# Patient Record
Sex: FEMALE | Race: OTHER | NOT HISPANIC OR LATINO | Employment: FULL TIME | URBAN - METROPOLITAN AREA
[De-identification: names, ages, dates, MRNs, and addresses within clinical notes are randomized per-mention and may not be internally consistent; named-entity substitution may affect disease eponyms.]

---

## 2019-09-25 ENCOUNTER — TELEPHONE (OUTPATIENT)
Dept: INTERNAL MEDICINE | Facility: CLINIC | Age: 44
End: 2019-09-25

## 2019-09-25 ENCOUNTER — OFFICE VISIT (OUTPATIENT)
Dept: INTERNAL MEDICINE | Facility: CLINIC | Age: 44
End: 2019-09-25
Payer: COMMERCIAL

## 2019-09-25 ENCOUNTER — HOSPITAL ENCOUNTER (OUTPATIENT)
Dept: RADIOLOGY | Facility: HOSPITAL | Age: 44
Discharge: HOME | End: 2019-09-25
Attending: FAMILY MEDICINE
Payer: COMMERCIAL

## 2019-09-25 VITALS
BODY MASS INDEX: 24.76 KG/M2 | HEART RATE: 82 BPM | HEIGHT: 60 IN | WEIGHT: 126.13 LBS | OXYGEN SATURATION: 99 % | SYSTOLIC BLOOD PRESSURE: 120 MMHG | DIASTOLIC BLOOD PRESSURE: 69 MMHG

## 2019-09-25 DIAGNOSIS — R21 FACIAL RASH: ICD-10-CM

## 2019-09-25 DIAGNOSIS — L30.9 ECZEMA, UNSPECIFIED TYPE: ICD-10-CM

## 2019-09-25 DIAGNOSIS — Z12.31 ENCOUNTER FOR SCREENING MAMMOGRAM FOR BREAST CANCER: ICD-10-CM

## 2019-09-25 DIAGNOSIS — Z00.00 ANNUAL PHYSICAL EXAM: Primary | ICD-10-CM

## 2019-09-25 DIAGNOSIS — Z86.69 OTITIS MEDIA FOLLOW-UP, INFECTION RESOLVED: ICD-10-CM

## 2019-09-25 DIAGNOSIS — Z09 OTITIS MEDIA FOLLOW-UP, INFECTION RESOLVED: ICD-10-CM

## 2019-09-25 PROCEDURE — 99386 PR PREVENTIVE VISIT,NEW,40-64: ICD-10-PCS | Mod: 25,S$GLB,, | Performed by: FAMILY MEDICINE

## 2019-09-25 PROCEDURE — 3008F BODY MASS INDEX DOCD: CPT | Mod: CPTII,S$GLB,, | Performed by: FAMILY MEDICINE

## 2019-09-25 PROCEDURE — 99999 PR PBB SHADOW E&M-NEW PATIENT-LVL IV: CPT | Mod: PBBFAC,,, | Performed by: FAMILY MEDICINE

## 2019-09-25 PROCEDURE — 99386 PREV VISIT NEW AGE 40-64: CPT | Mod: 25,S$GLB,, | Performed by: FAMILY MEDICINE

## 2019-09-25 PROCEDURE — 3008F PR BODY MASS INDEX (BMI) DOCUMENTED: ICD-10-PCS | Mod: CPTII,S$GLB,, | Performed by: FAMILY MEDICINE

## 2019-09-25 PROCEDURE — 99999 PR PBB SHADOW E&M-NEW PATIENT-LVL IV: ICD-10-PCS | Mod: PBBFAC,,, | Performed by: FAMILY MEDICINE

## 2019-09-25 PROCEDURE — 77067 SCR MAMMO BI INCL CAD: CPT | Mod: TC

## 2019-09-25 PROCEDURE — 77067 SCR MAMMO BI INCL CAD: CPT | Mod: 26,,, | Performed by: RADIOLOGY

## 2019-09-25 PROCEDURE — 77063 BREAST TOMOSYNTHESIS BI: CPT | Mod: 26,,, | Performed by: RADIOLOGY

## 2019-09-25 PROCEDURE — 77067 MAMMO DIGITAL SCREENING BILAT WITH TOMOSYNTHESIS_CAD: ICD-10-PCS | Mod: 26,,, | Performed by: RADIOLOGY

## 2019-09-25 PROCEDURE — 77063 MAMMO DIGITAL SCREENING BILAT WITH TOMOSYNTHESIS_CAD: ICD-10-PCS | Mod: 26,,, | Performed by: RADIOLOGY

## 2019-09-25 RX ORDER — TRIAMCINOLONE ACETONIDE 0.25 MG/G
CREAM TOPICAL 2 TIMES DAILY
Qty: 15 G | Refills: 2 | Status: SHIPPED | OUTPATIENT
Start: 2019-09-25 | End: 2021-07-28

## 2019-09-25 NOTE — PROGRESS NOTES
Subjective:       Patient ID: Oanh Mullen is a 43 y.o. female.    Chief Complaint: Establish Care and Facial Swelling (x1 wk)    HPI    43yoF presents to [facility] care.    Also c/o rash to face -- Noticed rdeness and swelling and itchiness x 1month. Getting better mildly over time. No new soap.    Ear infxn x 1wk -- was started on amoxicillin 1 wk ago for bilateral ear infxn but rash was already present prior to this. Only took 3-4 days worth, did not finish full 1 week course. Did not make rash worse, pt states did feel more itchy once started amoxicillin.    Review of Systems   Constitutional: Negative for appetite change, fatigue and fever.   HENT: Negative for ear pain, hearing loss, sinus pain and sore throat.    Eyes: Negative for pain and redness.   Respiratory: Negative for cough and shortness of breath.    Cardiovascular: Negative for chest pain and palpitations.   Gastrointestinal: Negative for abdominal pain, constipation, diarrhea, nausea and vomiting.   Endocrine: Negative for cold intolerance and heat intolerance.   Genitourinary: Negative for dysuria.   Musculoskeletal: Negative for back pain and neck pain.   Skin: Positive for rash.   Neurological: Negative for dizziness, weakness, numbness and headaches.         Past Medical History:   Diagnosis Date    Hemorrhoids         Prior to Admission medications    Not on File        Past medical history, surgical history, and family medical history reviewed and updated as appropriate.    Medications and allergies reviewed.     Objective:          Vitals:    09/25/19 0920   BP: 120/69   BP Location: Right arm   Patient Position: Sitting   BP Method: Medium (Manual)   Pulse: 82   SpO2: 99%   Weight: 57.2 kg (126 lb 1.7 oz)   Height: 5' (1.524 m)     Body mass index is 24.63 kg/m².  Physical Exam   Constitutional: She appears well-developed and well-nourished.   HENT:   Right Ear: Hearing normal. Tympanic membrane is not erythematous and not bulging.   Left  Ear: Hearing normal. Tympanic membrane is not erythematous and not bulging.   Dry skin noted throughout bilateral ear canals along with dry patch behind right ear around 2-3cm in diameter.    Eyes: Pupils are equal, round, and reactive to light. EOM are normal.   Cardiovascular: Normal rate, regular rhythm, normal heart sounds and intact distal pulses.   No murmur heard.  Pulmonary/Chest: Effort normal and breath sounds normal. No respiratory distress. She has no wheezes.   Skin: Rash noted. Rash is urticarial.   Erythematous swelling noted under bilateral eyes and bilateral cheeks. Dry excoriated skin around left index finger dorsally MCP.    Vitals reviewed.      Lab Results   Component Value Date    WBC 4.07 09/25/2019    HGB 11.9 (L) 09/25/2019    HCT 35.1 (L) 09/25/2019     09/25/2019    CHOL 135 06/30/2016    TRIG 221 (H) 06/30/2016    HDL 37 (L) 06/30/2016    ALT 9 (L) 09/25/2019    AST 20 09/25/2019     09/25/2019    K 3.9 09/25/2019     09/25/2019    CREATININE 0.7 09/25/2019    BUN 8 09/25/2019    CO2 26 09/25/2019    TSH 1.475 06/30/2016    INR 1.0 04/18/2014    HGBA1C 5.3 08/08/2014       Assessment:       1. Annual physical exam    2. Encounter for screening mammogram for breast cancer    3. Eczema, unspecified type    4. Facial rash    5. Otitis media follow-up, infection resolved        Plan:   Oanh was seen today for establish care and facial swelling.    Diagnoses and all orders for this visit:    Annual physical exam  -     Comprehensive metabolic panel; Future  -     CBC auto differential; Future  -     Lipid panel; Future  -     Hemoglobin A1c; Future  -     TSH; Future  - general labwork today and will f/u with patient on results. Records reviewed from previous visits.     Encounter for screening mammogram for breast cancer  -     Cancel: Mammo Digital Screening Bilat; Future  - due for mgram screening, does see obgyn who can also order. Discussed that she is also due for pap  screening which pt wanted to discuss further with radha washburn (Dr. Jensen)    Eczema, unspecified type  -     triamcinolone acetonide 0.025% (KENALOG) 0.025 % cream; Apply topically 2 (two) times daily.  - will try topical steroid for itchy rash / eczema, f/u 2 wks to see if this plus benadryl resolves symptoms. Patient given ED or UC precautions should symptoms progress or worsen.    Facial rash  - dry scaly rash behind right ear and within bl ear canals. Concerning for seborrheic dermatitis vs. Other. Will start with treatment for eczema and allergic inflammation with otc benadryl along with topical steroid cream. Consider topical antifungals. Consider derm vs. Allergy referral.    Bilateral ear infection  - rec OTC ear drops for lubrication today as noted dry skin in external ear canals, no redness or bulging appreciated, pt did stop abx before finishing full course. F/u next visit.      Health maintenance reviewed with patient. Will follow up with Dr. Jensen ([facility]) for pap smear as due at this time.     No follow-ups on file.    Mich uGy MD  Family Medicine  [facility]  9/25/2019

## 2019-09-25 NOTE — PATIENT INSTRUCTIONS
Benadryl over the counter for itchiness and rash. Use at night or as needed during the day. Once rash resolves, stop Benadryl and can start Claritin or Zyrtec daily for 1-2 months with changing of seasons.     Try Over the counter ear drop in bilateral ears to potentially help with dry skin.

## 2019-09-26 ENCOUNTER — TELEPHONE (OUTPATIENT)
Dept: INTERNAL MEDICINE | Facility: CLINIC | Age: 44
End: 2019-09-26

## 2019-10-09 ENCOUNTER — OFFICE VISIT (OUTPATIENT)
Dept: INTERNAL MEDICINE | Facility: CLINIC | Age: 44
End: 2019-10-09
Payer: COMMERCIAL

## 2019-10-09 VITALS
HEIGHT: 63 IN | HEART RATE: 64 BPM | SYSTOLIC BLOOD PRESSURE: 108 MMHG | OXYGEN SATURATION: 99 % | WEIGHT: 126.56 LBS | DIASTOLIC BLOOD PRESSURE: 70 MMHG | BODY MASS INDEX: 22.43 KG/M2

## 2019-10-09 DIAGNOSIS — J30.9 ALLERGIC RHINITIS, UNSPECIFIED SEASONALITY, UNSPECIFIED TRIGGER: Primary | ICD-10-CM

## 2019-10-09 PROCEDURE — 99999 PR PBB SHADOW E&M-EST. PATIENT-LVL III: CPT | Mod: PBBFAC,,, | Performed by: FAMILY MEDICINE

## 2019-10-09 PROCEDURE — 99999 PR PBB SHADOW E&M-EST. PATIENT-LVL III: ICD-10-PCS | Mod: PBBFAC,,, | Performed by: FAMILY MEDICINE

## 2019-10-09 PROCEDURE — 99214 PR OFFICE/OUTPT VISIT, EST, LEVL IV, 30-39 MIN: ICD-10-PCS | Mod: S$GLB,,, | Performed by: FAMILY MEDICINE

## 2019-10-09 PROCEDURE — 3008F PR BODY MASS INDEX (BMI) DOCUMENTED: ICD-10-PCS | Mod: CPTII,S$GLB,, | Performed by: FAMILY MEDICINE

## 2019-10-09 PROCEDURE — 3008F BODY MASS INDEX DOCD: CPT | Mod: CPTII,S$GLB,, | Performed by: FAMILY MEDICINE

## 2019-10-09 PROCEDURE — 99214 OFFICE O/P EST MOD 30 MIN: CPT | Mod: S$GLB,,, | Performed by: FAMILY MEDICINE

## 2019-10-09 RX ORDER — CETIRIZINE HYDROCHLORIDE 10 MG/1
10 TABLET ORAL DAILY
Qty: 30 TABLET | Refills: 5 | COMMUNITY
Start: 2019-10-09 | End: 2021-07-28

## 2019-10-09 RX ORDER — FLUTICASONE PROPIONATE 50 MCG
1 SPRAY, SUSPENSION (ML) NASAL DAILY
Qty: 16 G | Refills: 5 | Status: SHIPPED | OUTPATIENT
Start: 2019-10-09 | End: 2019-11-08

## 2019-10-09 NOTE — PATIENT INSTRUCTIONS
Try benadryl over the counter    Try Zyrtec (Cetirizine) once daily    Try Flonase nasal spray (one spray in each nostril once daily)

## 2019-10-09 NOTE — PROGRESS NOTES
"Subjective:       Patient ID: Oanh Mullen is a 43 y.o. female.    Chief Complaint: Follow-up (2 Week)    HPI    43yoF presents for 2 week follow up.    #Rash and ear infxn seen on 9/25/19  - wanted patient to try topical steroid cream and OTC benadryl but has not tried yet. Overall is improved today but reports that redness, rash and puffiness around face will still come and go.   - denies any new systemic symptoms  - believes that she knows when it will get worse when she has chocolate, or around hot things such as when cooking.    Review of Systems   Constitutional: Negative for appetite change, fatigue and fever.   HENT: Negative for ear pain, hearing loss, sinus pain and sore throat.    Eyes: Negative for pain and redness.   Respiratory: Negative for cough and shortness of breath.    Cardiovascular: Negative for chest pain and palpitations.   Gastrointestinal: Negative for abdominal pain, constipation, diarrhea, nausea and vomiting.   Endocrine: Negative for cold intolerance and heat intolerance.   Genitourinary: Negative for dysuria.   Musculoskeletal: Negative for back pain and neck pain.   Skin: Positive for rash.   Neurological: Negative for dizziness, weakness, numbness and headaches.         Past Medical History:   Diagnosis Date    Hemorrhoids         Prior to Admission medications    Medication Sig Start Date End Date Taking? Authorizing Provider   triamcinolone acetonide 0.025% (KENALOG) 0.025 % cream Apply topically 2 (two) times daily. 9/25/19  Yes Mich Guy MD        Past medical history, surgical history, and family medical history reviewed and updated as appropriate.    Medications and allergies reviewed.     Objective:          Vitals:    10/09/19 0850   BP: 108/70   BP Location: Right arm   Patient Position: Sitting   BP Method: Medium (Manual)   Pulse: 64   SpO2: 99%   Weight: 57.4 kg (126 lb 8.7 oz)   Height: 5' 2.5" (1.588 m)     Body mass index is 22.78 kg/m².  Physical Exam "   Constitutional: She appears well-developed and well-nourished.   HENT:   Right Ear: Hearing normal. Tympanic membrane is not erythematous and not bulging.   Left Ear: Hearing normal. Tympanic membrane is not erythematous and not bulging.   Dry skin noted throughout bilateral ear canals along with dry patch behind right ear around 2-3cm in diameter.    Eyes: Pupils are equal, round, and reactive to light. EOM are normal.   Cardiovascular: Normal rate, regular rhythm, normal heart sounds and intact distal pulses.   No murmur heard.  Pulmonary/Chest: Effort normal and breath sounds normal. No respiratory distress. She has no wheezes.   Skin: Rash noted. Rash is urticarial.   Rash seems improved today around face. No redness or swelling appreciated. Dry excoriated skin around left index finger dorsally MCP.    Vitals reviewed.      Lab Results   Component Value Date    WBC 4.07 09/25/2019    HGB 11.9 (L) 09/25/2019    HCT 35.1 (L) 09/25/2019     09/25/2019    CHOL 133 09/25/2019    TRIG 238 (H) 09/25/2019    HDL 35 (L) 09/25/2019    ALT 9 (L) 09/25/2019    AST 20 09/25/2019     09/25/2019    K 3.9 09/25/2019     09/25/2019    CREATININE 0.7 09/25/2019    BUN 8 09/25/2019    CO2 26 09/25/2019    TSH 0.909 09/25/2019    INR 1.0 04/18/2014    HGBA1C 5.5 09/25/2019       Assessment:       1. Allergic rhinitis, unspecified seasonality, unspecified trigger        Plan:   Oanh was seen today for follow-up.    Diagnoses and all orders for this visit:    Allergic rhinitis, unspecified seasonality, unspecified trigger  -     fluticasone propionate (FLONASE) 50 mcg/actuation nasal spray; 1 spray (50 mcg total) by Each Nostril route once daily.  -     cetirizine (ZYRTEC) 10 MG tablet; Take 1 tablet (10 mg total) by mouth once daily.  - counseled on actually trying some of these medications to see if helps, RTC prn in next month if symptoms do not improved with these meds incl topical steroid as well for  extremities. Can consider allergist in future as well. Avoid known irritants.      Follow up in about 6 years (around 10/9/2025).    Mich Guy MD  Family Medicine  [facility]  10/9/2019

## 2019-12-30 PROBLEM — Z86.69 OTITIS MEDIA FOLLOW-UP, INFECTION RESOLVED: Status: RESOLVED | Noted: 2019-09-25 | Resolved: 2019-12-30

## 2019-12-30 PROBLEM — Z09 OTITIS MEDIA FOLLOW-UP, INFECTION RESOLVED: Status: RESOLVED | Noted: 2019-09-25 | Resolved: 2019-12-30

## 2019-12-30 PROBLEM — Z00.00 ANNUAL PHYSICAL EXAM: Status: RESOLVED | Noted: 2019-09-25 | Resolved: 2019-12-30

## 2020-03-30 ENCOUNTER — PATIENT MESSAGE (OUTPATIENT)
Dept: INTERNAL MEDICINE | Facility: CLINIC | Age: 45
End: 2020-03-30

## 2020-10-05 ENCOUNTER — PATIENT MESSAGE (OUTPATIENT)
Dept: ADMINISTRATIVE | Facility: HOSPITAL | Age: 45
End: 2020-10-05

## 2020-10-27 ENCOUNTER — PATIENT OUTREACH (OUTPATIENT)
Dept: ADMINISTRATIVE | Facility: HOSPITAL | Age: 45
End: 2020-10-27

## 2020-10-27 ENCOUNTER — PATIENT MESSAGE (OUTPATIENT)
Dept: ADMINISTRATIVE | Facility: HOSPITAL | Age: 45
End: 2020-10-27

## 2020-11-04 ENCOUNTER — PATIENT OUTREACH (OUTPATIENT)
Dept: ADMINISTRATIVE | Facility: HOSPITAL | Age: 45
End: 2020-11-04

## 2020-11-04 NOTE — PROGRESS NOTES
Chart Auditing Tool   Topic Results    Care Everywhere Not completed    Reconcile Immunizations Completed    Care Team completed    Care Coordination Note/Care Mgt Note completed    HM Review completed    Orders Placed NA    Outreach Performed Completed    Appointments scheduled LVM/Portal Message     Chart Audit Complete: Notes:    Ladi Pagan LPN  Lead Clinical Care Coordinator   [facility]

## 2021-01-04 ENCOUNTER — PATIENT MESSAGE (OUTPATIENT)
Dept: ADMINISTRATIVE | Facility: HOSPITAL | Age: 46
End: 2021-01-04

## 2021-01-15 DIAGNOSIS — Z12.31 OTHER SCREENING MAMMOGRAM: ICD-10-CM

## 2021-03-30 ENCOUNTER — PATIENT MESSAGE (OUTPATIENT)
Dept: ADMINISTRATIVE | Facility: HOSPITAL | Age: 46
End: 2021-03-30

## 2021-03-30 ENCOUNTER — PATIENT OUTREACH (OUTPATIENT)
Dept: ADMINISTRATIVE | Facility: HOSPITAL | Age: 46
End: 2021-03-30

## 2021-04-05 ENCOUNTER — PATIENT MESSAGE (OUTPATIENT)
Dept: ADMINISTRATIVE | Facility: HOSPITAL | Age: 46
End: 2021-04-05

## 2021-04-20 ENCOUNTER — PATIENT MESSAGE (OUTPATIENT)
Dept: ADMINISTRATIVE | Facility: HOSPITAL | Age: 46
End: 2021-04-20

## 2021-04-20 ENCOUNTER — PATIENT OUTREACH (OUTPATIENT)
Dept: ADMINISTRATIVE | Facility: HOSPITAL | Age: 46
End: 2021-04-20

## 2021-07-01 ENCOUNTER — PATIENT MESSAGE (OUTPATIENT)
Dept: ADMINISTRATIVE | Facility: OTHER | Age: 46
End: 2021-07-01

## 2021-07-07 ENCOUNTER — PATIENT MESSAGE (OUTPATIENT)
Dept: ADMINISTRATIVE | Facility: HOSPITAL | Age: 46
End: 2021-07-07

## 2021-07-07 ENCOUNTER — TELEPHONE (OUTPATIENT)
Dept: INTERNAL MEDICINE | Facility: CLINIC | Age: 46
End: 2021-07-07

## 2021-07-07 ENCOUNTER — PATIENT OUTREACH (OUTPATIENT)
Dept: ADMINISTRATIVE | Facility: HOSPITAL | Age: 46
End: 2021-07-07

## 2021-07-07 DIAGNOSIS — Z00.00 ENCOUNTER FOR ANNUAL PHYSICAL EXAM: Primary | ICD-10-CM

## 2021-07-28 ENCOUNTER — OFFICE VISIT (OUTPATIENT)
Dept: INTERNAL MEDICINE | Facility: CLINIC | Age: 46
End: 2021-07-28
Payer: COMMERCIAL

## 2021-07-28 ENCOUNTER — HOSPITAL ENCOUNTER (OUTPATIENT)
Dept: RADIOLOGY | Facility: HOSPITAL | Age: 46
Discharge: HOME | End: 2021-07-28
Attending: FAMILY MEDICINE
Payer: COMMERCIAL

## 2021-07-28 VITALS
OXYGEN SATURATION: 98 % | HEART RATE: 75 BPM | HEIGHT: 63 IN | WEIGHT: 121.69 LBS | SYSTOLIC BLOOD PRESSURE: 108 MMHG | BODY MASS INDEX: 21.56 KG/M2 | DIASTOLIC BLOOD PRESSURE: 64 MMHG

## 2021-07-28 DIAGNOSIS — Z12.31 ENCOUNTER FOR SCREENING MAMMOGRAM FOR BREAST CANCER: ICD-10-CM

## 2021-07-28 DIAGNOSIS — Z12.11 ENCOUNTER FOR COLORECTAL CANCER SCREENING: ICD-10-CM

## 2021-07-28 DIAGNOSIS — Z00.00 ANNUAL PHYSICAL EXAM: Primary | ICD-10-CM

## 2021-07-28 DIAGNOSIS — Z12.12 ENCOUNTER FOR COLORECTAL CANCER SCREENING: ICD-10-CM

## 2021-07-28 DIAGNOSIS — Z12.4 CERVICAL CANCER SCREENING: ICD-10-CM

## 2021-07-28 PROBLEM — J30.9 ALLERGIC RHINITIS: Status: ACTIVE | Noted: 2021-07-28

## 2021-07-28 PROBLEM — K59.09 OTHER CONSTIPATION: Status: ACTIVE | Noted: 2021-07-28

## 2021-07-28 PROCEDURE — 99396 PR PREVENTIVE VISIT,EST,40-64: ICD-10-PCS | Mod: S$GLB,,, | Performed by: FAMILY MEDICINE

## 2021-07-28 PROCEDURE — 1159F MED LIST DOCD IN RCRD: CPT | Mod: CPTII,S$GLB,, | Performed by: FAMILY MEDICINE

## 2021-07-28 PROCEDURE — 77063 BREAST TOMOSYNTHESIS BI: CPT | Mod: 26,,, | Performed by: RADIOLOGY

## 2021-07-28 PROCEDURE — 77067 SCR MAMMO BI INCL CAD: CPT | Mod: 26,,, | Performed by: RADIOLOGY

## 2021-07-28 PROCEDURE — 99999 PR PBB SHADOW E&M-EST. PATIENT-LVL III: CPT | Mod: PBBFAC,,, | Performed by: FAMILY MEDICINE

## 2021-07-28 PROCEDURE — 1160F RVW MEDS BY RX/DR IN RCRD: CPT | Mod: CPTII,S$GLB,, | Performed by: FAMILY MEDICINE

## 2021-07-28 PROCEDURE — 77067 SCR MAMMO BI INCL CAD: CPT | Mod: TC

## 2021-07-28 PROCEDURE — 1159F PR MEDICATION LIST DOCUMENTED IN MEDICAL RECORD: ICD-10-PCS | Mod: CPTII,S$GLB,, | Performed by: FAMILY MEDICINE

## 2021-07-28 PROCEDURE — 3008F PR BODY MASS INDEX (BMI) DOCUMENTED: ICD-10-PCS | Mod: CPTII,S$GLB,, | Performed by: FAMILY MEDICINE

## 2021-07-28 PROCEDURE — 99396 PREV VISIT EST AGE 40-64: CPT | Mod: S$GLB,,, | Performed by: FAMILY MEDICINE

## 2021-07-28 PROCEDURE — 1126F AMNT PAIN NOTED NONE PRSNT: CPT | Mod: CPTII,S$GLB,, | Performed by: FAMILY MEDICINE

## 2021-07-28 PROCEDURE — 1126F PR PAIN SEVERITY QUANTIFIED, NO PAIN PRESENT: ICD-10-PCS | Mod: CPTII,S$GLB,, | Performed by: FAMILY MEDICINE

## 2021-07-28 PROCEDURE — 77063 MAMMO DIGITAL SCREENING BILAT WITH TOMO: ICD-10-PCS | Mod: 26,,, | Performed by: RADIOLOGY

## 2021-07-28 PROCEDURE — 77067 MAMMO DIGITAL SCREENING BILAT WITH TOMO: ICD-10-PCS | Mod: 26,,, | Performed by: RADIOLOGY

## 2021-07-28 PROCEDURE — 3008F BODY MASS INDEX DOCD: CPT | Mod: CPTII,S$GLB,, | Performed by: FAMILY MEDICINE

## 2021-07-28 PROCEDURE — 99999 PR PBB SHADOW E&M-EST. PATIENT-LVL III: ICD-10-PCS | Mod: PBBFAC,,, | Performed by: FAMILY MEDICINE

## 2021-07-28 PROCEDURE — 1160F PR REVIEW ALL MEDS BY PRESCRIBER/CLIN PHARMACIST DOCUMENTED: ICD-10-PCS | Mod: CPTII,S$GLB,, | Performed by: FAMILY MEDICINE

## 2021-08-02 ENCOUNTER — TELEPHONE (OUTPATIENT)
Dept: RADIOLOGY | Facility: HOSPITAL | Age: 46
End: 2021-08-02

## 2021-08-13 ENCOUNTER — TELEPHONE (OUTPATIENT)
Dept: RADIOLOGY | Facility: HOSPITAL | Age: 46
End: 2021-08-13

## 2021-08-16 ENCOUNTER — TELEPHONE (OUTPATIENT)
Dept: RADIOLOGY | Facility: HOSPITAL | Age: 46
End: 2021-08-16

## 2021-08-23 ENCOUNTER — HOSPITAL ENCOUNTER (OUTPATIENT)
Dept: RADIOLOGY | Facility: HOSPITAL | Age: 46
Discharge: HOME | End: 2021-08-23
Attending: FAMILY MEDICINE
Payer: COMMERCIAL

## 2021-08-23 DIAGNOSIS — R92.8 ABNORMAL MAMMOGRAM: ICD-10-CM

## 2021-08-23 PROCEDURE — 76642 ULTRASOUND BREAST LIMITED: CPT | Mod: TC,RT

## 2021-08-23 PROCEDURE — 77061 BREAST TOMOSYNTHESIS UNI: CPT | Mod: 26,RT,, | Performed by: RADIOLOGY

## 2021-08-23 PROCEDURE — 77061 MAMMO DIGITAL DIAGNOSTIC RIGHT WITH TOMO: ICD-10-PCS | Mod: 26,RT,, | Performed by: RADIOLOGY

## 2021-08-23 PROCEDURE — 76642 US BREAST RIGHT LIMITED: ICD-10-PCS | Mod: 26,RT,, | Performed by: RADIOLOGY

## 2021-08-23 PROCEDURE — 76642 ULTRASOUND BREAST LIMITED: CPT | Mod: 26,RT,, | Performed by: RADIOLOGY

## 2021-08-23 PROCEDURE — 77061 BREAST TOMOSYNTHESIS UNI: CPT | Mod: TC,RT

## 2021-08-23 PROCEDURE — 77065 DX MAMMO INCL CAD UNI: CPT | Mod: 26,RT,, | Performed by: RADIOLOGY

## 2021-08-23 PROCEDURE — 77065 MAMMO DIGITAL DIAGNOSTIC RIGHT WITH TOMO: ICD-10-PCS | Mod: 26,RT,, | Performed by: RADIOLOGY

## 2022-01-26 ENCOUNTER — PATIENT MESSAGE (OUTPATIENT)
Dept: ADMINISTRATIVE | Facility: HOSPITAL | Age: 47
End: 2022-01-26
Payer: COMMERCIAL

## 2022-03-16 ENCOUNTER — PATIENT MESSAGE (OUTPATIENT)
Dept: ADMINISTRATIVE | Facility: HOSPITAL | Age: 47
End: 2022-03-16
Payer: COMMERCIAL

## 2022-05-30 ENCOUNTER — PATIENT MESSAGE (OUTPATIENT)
Dept: ADMINISTRATIVE | Facility: HOSPITAL | Age: 47
End: 2022-05-30
Payer: COMMERCIAL

## 2023-04-03 ENCOUNTER — PATIENT MESSAGE (OUTPATIENT)
Dept: ADMINISTRATIVE | Facility: HOSPITAL | Age: 48
End: 2023-04-03
Payer: COMMERCIAL

## 2023-10-06 ENCOUNTER — TELEPHONE (OUTPATIENT)
Dept: INTERNAL MEDICINE | Facility: CLINIC | Age: 48
End: 2023-10-06
Payer: COMMERCIAL